# Patient Record
(demographics unavailable — no encounter records)

---

## 2024-10-18 NOTE — DATA REVIEWED
[FreeTextEntry1] : CT chest non-contrast (8/15/24 at ): IMPRESSION: No unenhanced CT evidence for lung interstitial fibrosis, consolidation or dominant nodule. Areas of subsegmental atelectasis discoid and dominant in the posterior right lung base. Mild calcified coronary arterial atheromatous plaque. Heart size is not enlarged  Labs from [place] (8/15/24): Creatinine: 0.80, eGFR: 105 AST: 25, ALT: 19 WBC: 4.4 Hgb: 14.8 Platelet count: 297 ESR: 3, CRP: 0.1 25-OH-vitamin D: 62.8

## 2024-10-18 NOTE — HISTORY OF PRESENT ILLNESS
[FreeTextEntry1] : The patient is a 55-year-old male with a PMH of seropositive rheumatoid arthritis and basal cell carcinoma of the skin who presented to the office for an initial visit. He was previously following-up at [facility] for pain in multiple joints for the past year and was diagnosed with seropositive rheumatoid arthritis. He was started on oral methotrexate which resulted in a significant improvement of his symptoms. He has since been taking methotrexate 20 mg every week with folic acid daily. He denied experiencing any adverse effects from the drug. He currently feels well and had no new complaints. He has made changes to his lifestyle including exercising and consuming a plant-based diet which he thinks has contributed to him feeling better. He denied having any prolonged morning stiffness in his joints and denied noticing any swelling in his joints.

## 2024-10-18 NOTE — PHYSICAL EXAM
[General Appearance - Alert] : alert [General Appearance - In No Acute Distress] : in no acute distress [Extraocular Movements] : extraocular movements were intact [Musculoskeletal - Swelling] : no joint swelling seen [Motor Tone] : muscle strength and tone were normal [Affect] : the affect was normal

## 2024-10-18 NOTE — ASSESSMENT
[FreeTextEntry1] : The patient is a 55-year-old male with a PMH of seropositive rheumatoid arthritis and basal cell carcinoma of the skin who presented to the office for an initial visit. He was previously following-up at [facility] for pain in multiple joints for the past year and was diagnosed with seropositive rheumatoid arthritis. He was started on oral methotrexate which resulted in a significant improvement of his symptoms. He underwent an MRI of his right hand in March 2024 which reported possible erosive changes. He also underwent a CT chest in August 2024 which reported no evidence of interstitial fibrosis.   Seropositive rheumatoid arthritis (RF 14, CCP >250) with low disease activity.  Plan: Continue methotrexate 20 mg oral once every 7 days and folic acid 1 mg daily Will defer starting any additional DMARDs at this time after shared decision-making Will consider repeating MRI of the hand in 2025 to evaluate for joint damage Continue vitamin D supplement (800-1000 IU daily) Labs were drawn in-office today (CBC, CMP, ESR, CRP); will call the patient with the results Repeat labs before the next visit   Additional recommendations according to ACR guidelines include consistent engagement in exercise, physical/occupational therapy, Mediterranean diet, and other non-pharmacological interventions as needed.  Recommend primary care follow-up for routine preventative screenings to help monitor and reduce the risk of cardiovascular disease, diabetes, and cancer.   Return to office in 3 months or sooner as needed.